# Patient Record
Sex: MALE | Race: WHITE
[De-identification: names, ages, dates, MRNs, and addresses within clinical notes are randomized per-mention and may not be internally consistent; named-entity substitution may affect disease eponyms.]

---

## 2017-06-05 ENCOUNTER — HOSPITAL ENCOUNTER (OUTPATIENT)
Dept: HOSPITAL 47 - RADXRMAIN | Age: 37
Discharge: HOME | End: 2017-06-05
Payer: COMMERCIAL

## 2017-06-05 DIAGNOSIS — J44.9: Primary | ICD-10-CM

## 2017-06-05 PROCEDURE — 71020: CPT
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
## 2017-07-28 ENCOUNTER — HOSPITAL ENCOUNTER (EMERGENCY)
Dept: HOSPITAL 47 - EC | Age: 37
Discharge: HOME | End: 2017-07-28
Payer: COMMERCIAL

## 2017-07-28 VITALS
SYSTOLIC BLOOD PRESSURE: 137 MMHG | RESPIRATION RATE: 20 BRPM | DIASTOLIC BLOOD PRESSURE: 77 MMHG | HEART RATE: 63 BPM | TEMPERATURE: 98 F

## 2017-07-28 DIAGNOSIS — Z79.899: ICD-10-CM

## 2017-07-28 DIAGNOSIS — Z88.6: ICD-10-CM

## 2017-07-28 DIAGNOSIS — F17.200: ICD-10-CM

## 2017-07-28 DIAGNOSIS — H10.31: Primary | ICD-10-CM

## 2017-07-28 PROCEDURE — 99283 EMERGENCY DEPT VISIT LOW MDM: CPT
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
## 2017-08-21 ENCOUNTER — HOSPITAL ENCOUNTER (EMERGENCY)
Dept: HOSPITAL 47 - EC | Age: 37
Discharge: HOME | End: 2017-08-21
Payer: COMMERCIAL

## 2017-08-21 VITALS
SYSTOLIC BLOOD PRESSURE: 133 MMHG | DIASTOLIC BLOOD PRESSURE: 77 MMHG | RESPIRATION RATE: 18 BRPM | TEMPERATURE: 98.4 F | HEART RATE: 81 BPM

## 2017-08-21 DIAGNOSIS — Z79.899: ICD-10-CM

## 2017-08-21 DIAGNOSIS — Z88.6: ICD-10-CM

## 2017-08-21 DIAGNOSIS — S61.411A: Primary | ICD-10-CM

## 2017-08-21 DIAGNOSIS — F17.200: ICD-10-CM
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
## 2017-08-21 PROCEDURE — 12001 RPR S/N/AX/GEN/TRNK 2.5CM/<: CPT

## 2017-08-21 PROCEDURE — 99283 EMERGENCY DEPT VISIT LOW MDM: CPT
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
## 2017-10-06 ENCOUNTER — HOSPITAL ENCOUNTER (EMERGENCY)
Dept: HOSPITAL 47 - EC | Age: 37
Discharge: HOME | End: 2017-10-06
Payer: COMMERCIAL

## 2017-10-06 VITALS
DIASTOLIC BLOOD PRESSURE: 90 MMHG | TEMPERATURE: 98.2 F | RESPIRATION RATE: 15 BRPM | SYSTOLIC BLOOD PRESSURE: 122 MMHG | HEART RATE: 60 BPM

## 2017-10-06 DIAGNOSIS — F17.200: ICD-10-CM

## 2017-10-06 DIAGNOSIS — Z88.6: ICD-10-CM

## 2017-10-06 DIAGNOSIS — L02.11: Primary | ICD-10-CM

## 2017-10-06 DIAGNOSIS — Z79.899: ICD-10-CM

## 2017-10-06 PROCEDURE — 99282 EMERGENCY DEPT VISIT SF MDM: CPT

## 2017-10-06 PROCEDURE — 10060 I&D ABSCESS SIMPLE/SINGLE: CPT
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
## 2017-10-17 ENCOUNTER — HOSPITAL ENCOUNTER (EMERGENCY)
Dept: HOSPITAL 47 - EC | Age: 37
Discharge: HOME | End: 2017-10-17
Payer: COMMERCIAL

## 2017-10-17 VITALS
TEMPERATURE: 97.7 F | RESPIRATION RATE: 18 BRPM | HEART RATE: 63 BPM | SYSTOLIC BLOOD PRESSURE: 153 MMHG | DIASTOLIC BLOOD PRESSURE: 80 MMHG

## 2017-10-17 DIAGNOSIS — Z79.899: ICD-10-CM

## 2017-10-17 DIAGNOSIS — Z88.6: ICD-10-CM

## 2017-10-17 DIAGNOSIS — Z79.51: ICD-10-CM
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
## 2017-10-17 DIAGNOSIS — S16.1XXA: Primary | ICD-10-CM

## 2017-10-17 DIAGNOSIS — M62.838: ICD-10-CM

## 2017-10-17 DIAGNOSIS — F17.200: ICD-10-CM

## 2017-10-17 PROCEDURE — 72050 X-RAY EXAM NECK SPINE 4/5VWS: CPT

## 2017-10-17 PROCEDURE — 99283 EMERGENCY DEPT VISIT LOW MDM: CPT
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
## 2017-11-28 ENCOUNTER — HOSPITAL ENCOUNTER (OUTPATIENT)
Dept: HOSPITAL 47 - SLEEP | Age: 37
Discharge: HOME | End: 2017-11-28
Payer: COMMERCIAL

## 2017-11-28 DIAGNOSIS — M25.519: ICD-10-CM

## 2017-11-28 DIAGNOSIS — M54.2: ICD-10-CM

## 2017-11-28 DIAGNOSIS — G56.03: ICD-10-CM

## 2017-11-28 DIAGNOSIS — G47.00: Primary | ICD-10-CM

## 2017-11-28 DIAGNOSIS — G89.29: ICD-10-CM

## 2017-11-28 DIAGNOSIS — F17.210: ICD-10-CM

## 2017-11-28 DIAGNOSIS — M06.9: ICD-10-CM

## 2017-11-28 DIAGNOSIS — G43.909: ICD-10-CM

## 2017-11-28 DIAGNOSIS — Z79.899: ICD-10-CM

## 2017-11-28 PROCEDURE — 99211 OFF/OP EST MAY X REQ PHY/QHP: CPT
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
## 2018-06-20 ENCOUNTER — HOSPITAL ENCOUNTER (OUTPATIENT)
Dept: HOSPITAL 47 - RADPROMAIN | Age: 38
Discharge: HOME | End: 2018-06-20
Attending: NEUROLOGICAL SURGERY
Payer: COMMERCIAL

## 2018-06-20 VITALS
DIASTOLIC BLOOD PRESSURE: 81 MMHG | SYSTOLIC BLOOD PRESSURE: 137 MMHG | TEMPERATURE: 97.9 F | HEART RATE: 58 BPM | RESPIRATION RATE: 16 BRPM

## 2018-06-20 VITALS — BODY MASS INDEX: 29 KG/M2

## 2018-06-20 DIAGNOSIS — M51.36: ICD-10-CM

## 2018-06-20 DIAGNOSIS — M50.33: Primary | ICD-10-CM

## 2018-06-20 PROCEDURE — 72126 CT NECK SPINE W/DYE: CPT

## 2018-06-20 PROCEDURE — 62305 MYELOGRAPHY LUMBAR INJECTION: CPT

## 2018-06-20 PROCEDURE — 72132 CT LUMBAR SPINE W/DYE: CPT

## 2018-06-20 NOTE — FL
PROCEDURE: Fluoroscopy myelogram injection.

 

DATE: 6/20/2018

 

CLINICAL HISTORY: 38-year-old male with a headache, neck, and back pain. Cervical and lumbar disc deg
eneration.

 

COMPLICATIONS: None.

 

SEDATION: 

Standard medication administered by nursing.  The patient and the patient's vital signs were monitore
d by qualified independent radiology personnel. 

 

TECHNIQUE:

The procedure and potential risks were explained to patient and an informed consent was obtained with
 teach back. Site and side was verified. A time out was performed. 

 

The patient was placed prone on the fluoroscopy table and the L3-L4 level was localized and the skin 
was marked and was prepped and draped in the usual sterile fashion. 

 

Lidocaine was used for local anesthesia. Utilizing fluoroscopic guidance a 22-gauge spinal needle was
 placed through the skin and into the subarachnoid space. 

 

After return of clear CSF, 8 mL of Isovue 300 contrast material was injected into the subarachnoid sp
ace.

 

Needle was removed, hemostasis obtained, and dressing placed. No blood loss.

 

Patient's neck was extended and the table was maneuvered so that the contrast would travel into the c
ervical region. However, when the table was tilted head down, the patient began complaining of severe
 pain and discomfort. The patient was brought flat and no further manipulation was performed. The int
rathecal contrast had extended only to the thoracic level.

 

Vital signs were assessed by nursing and the patient's condition improved over the course of 5 minute
s. Decision was made not to attempt further manipulation. Instructions were given to wait 45 minutes 
prior to performing the CT myelogram.

 

The patient was was sent back to the recovery room in satisfactory condition. 

 

The patient's condition was unchanged following the procedure.

 

Fluoroscopy time: 48

Total images: 5.

 

 

IMPRESSION:

Successful myelogram injection for CT. Instructions were given to wait 45 minutes prior to scanning a
s the patient could not tolerate head down position on the table to get adequate intrathecal opacific
ation at the level of the cervical spine.
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
## 2020-02-20 ENCOUNTER — HOSPITAL ENCOUNTER (EMERGENCY)
Dept: HOSPITAL 47 - EC | Age: 40
Discharge: HOME | End: 2020-02-20
Payer: COMMERCIAL

## 2020-02-20 VITALS
RESPIRATION RATE: 17 BRPM | SYSTOLIC BLOOD PRESSURE: 119 MMHG | HEART RATE: 70 BPM | TEMPERATURE: 98.1 F | DIASTOLIC BLOOD PRESSURE: 74 MMHG

## 2020-02-20 DIAGNOSIS — M51.36: ICD-10-CM

## 2020-02-20 DIAGNOSIS — F17.200: ICD-10-CM
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
## 2020-02-20 DIAGNOSIS — G89.29: ICD-10-CM

## 2020-02-20 DIAGNOSIS — I10: ICD-10-CM

## 2020-02-20 DIAGNOSIS — S39.012A: Primary | ICD-10-CM

## 2020-02-20 DIAGNOSIS — M19.012: ICD-10-CM

## 2020-02-20 DIAGNOSIS — Z79.899: ICD-10-CM

## 2020-02-20 DIAGNOSIS — Z98.890: ICD-10-CM

## 2020-02-20 DIAGNOSIS — M19.011: ICD-10-CM

## 2020-02-20 DIAGNOSIS — M06.9: ICD-10-CM

## 2020-02-20 DIAGNOSIS — Z88.6: ICD-10-CM

## 2020-02-20 PROCEDURE — 99283 EMERGENCY DEPT VISIT LOW MDM: CPT

## 2020-02-20 PROCEDURE — 96372 THER/PROPH/DIAG INJ SC/IM: CPT
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
## 2021-02-08 ENCOUNTER — HOSPITAL ENCOUNTER (EMERGENCY)
Dept: HOSPITAL 47 - EC | Age: 41
Discharge: HOME | End: 2021-02-08
Payer: COMMERCIAL

## 2021-02-08 VITALS
RESPIRATION RATE: 18 BRPM | TEMPERATURE: 98.3 F | DIASTOLIC BLOOD PRESSURE: 84 MMHG | HEART RATE: 68 BPM | SYSTOLIC BLOOD PRESSURE: 155 MMHG

## 2021-02-08 DIAGNOSIS — F17.200: ICD-10-CM

## 2021-02-08 DIAGNOSIS — Z88.8: ICD-10-CM

## 2021-02-08 DIAGNOSIS — Z79.899: ICD-10-CM

## 2021-02-08 DIAGNOSIS — I10: ICD-10-CM

## 2021-02-08 DIAGNOSIS — K08.89: Primary | ICD-10-CM

## 2021-02-08 DIAGNOSIS — M06.9: ICD-10-CM

## 2021-02-08 PROCEDURE — 96372 THER/PROPH/DIAG INJ SC/IM: CPT

## 2021-02-08 PROCEDURE — 99282 EMERGENCY DEPT VISIT SF MDM: CPT
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
## 2022-01-28 ENCOUNTER — HOSPITAL ENCOUNTER (EMERGENCY)
Dept: HOSPITAL 47 - EC | Age: 42
Discharge: HOME | End: 2022-01-28
Payer: COMMERCIAL

## 2022-01-28 VITALS — HEART RATE: 87 BPM | SYSTOLIC BLOOD PRESSURE: 112 MMHG | RESPIRATION RATE: 17 BRPM | DIASTOLIC BLOOD PRESSURE: 76 MMHG

## 2022-01-28 VITALS — TEMPERATURE: 98.5 F

## 2022-01-28 DIAGNOSIS — F12.90: ICD-10-CM

## 2022-01-28 DIAGNOSIS — F41.9: ICD-10-CM

## 2022-01-28 DIAGNOSIS — S90.31XA: Primary | ICD-10-CM

## 2022-01-28 DIAGNOSIS — F17.200: ICD-10-CM
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
## 2022-01-28 DIAGNOSIS — G43.909: ICD-10-CM

## 2022-01-28 DIAGNOSIS — I10: ICD-10-CM

## 2022-01-28 DIAGNOSIS — Z72.89: ICD-10-CM

## 2022-01-28 DIAGNOSIS — F32.A: ICD-10-CM

## 2022-01-28 DIAGNOSIS — M19.90: ICD-10-CM

## 2022-01-28 DIAGNOSIS — M06.9: ICD-10-CM

## 2022-01-28 PROCEDURE — 99284 EMERGENCY DEPT VISIT MOD MDM: CPT

## 2022-01-28 PROCEDURE — 72125 CT NECK SPINE W/O DYE: CPT

## 2022-01-28 PROCEDURE — 70450 CT HEAD/BRAIN W/O DYE: CPT

## 2022-01-28 NOTE — XR
EXAMINATION TYPE: XR foot complete RT

 

DATE OF EXAM: 1/28/2022

 

COMPARISON: NONE

 

HISTORY: Pain

 

TECHNIQUE: 3 views

 

FINDINGS: Metatarsals are intact. The toes appear intact. I see no fracture nor dislocation.

 

IMPRESSION: Negative right foot exam. No fracture seen
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
## 2023-03-16 ENCOUNTER — HOSPITAL ENCOUNTER (EMERGENCY)
Dept: HOSPITAL 47 - EC | Age: 43
Discharge: HOME | End: 2023-03-16
Payer: COMMERCIAL

## 2023-03-16 VITALS
HEART RATE: 57 BPM | RESPIRATION RATE: 18 BRPM | DIASTOLIC BLOOD PRESSURE: 91 MMHG | TEMPERATURE: 97.8 F | SYSTOLIC BLOOD PRESSURE: 132 MMHG

## 2023-03-16 DIAGNOSIS — Z79.51: ICD-10-CM

## 2023-03-16 DIAGNOSIS — Z79.899: ICD-10-CM

## 2023-03-16 DIAGNOSIS — R19.7: Primary | ICD-10-CM

## 2023-03-16 DIAGNOSIS — F17.200: ICD-10-CM

## 2023-03-16 DIAGNOSIS — F32.A: ICD-10-CM

## 2023-03-16 DIAGNOSIS — F41.9: ICD-10-CM

## 2023-03-16 DIAGNOSIS — Z79.1: ICD-10-CM

## 2023-03-16 DIAGNOSIS — I10: ICD-10-CM

## 2023-03-16 DIAGNOSIS — F12.90: ICD-10-CM

## 2023-03-16 DIAGNOSIS — Z88.8: ICD-10-CM

## 2023-03-16 DIAGNOSIS — E86.0: ICD-10-CM

## 2023-03-16 PROCEDURE — 99283 EMERGENCY DEPT VISIT LOW MDM: CPT
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
